# Patient Record
Sex: FEMALE | Race: BLACK OR AFRICAN AMERICAN | NOT HISPANIC OR LATINO | URBAN - METROPOLITAN AREA
[De-identification: names, ages, dates, MRNs, and addresses within clinical notes are randomized per-mention and may not be internally consistent; named-entity substitution may affect disease eponyms.]

---

## 2019-10-29 ENCOUNTER — EMERGENCY (EMERGENCY)
Facility: HOSPITAL | Age: 24
LOS: 1 days | Discharge: HOME | End: 2019-10-29
Attending: EMERGENCY MEDICINE | Admitting: EMERGENCY MEDICINE
Payer: COMMERCIAL

## 2019-10-29 VITALS
TEMPERATURE: 97 F | RESPIRATION RATE: 17 BRPM | DIASTOLIC BLOOD PRESSURE: 66 MMHG | OXYGEN SATURATION: 99 % | HEART RATE: 105 BPM | SYSTOLIC BLOOD PRESSURE: 93 MMHG

## 2019-10-29 PROCEDURE — 99284 EMERGENCY DEPT VISIT MOD MDM: CPT

## 2019-10-29 PROCEDURE — 82962 GLUCOSE BLOOD TEST: CPT

## 2019-10-29 PROCEDURE — 99285 EMERGENCY DEPT VISIT HI MDM: CPT

## 2019-10-29 NOTE — ED PROVIDER NOTE - CLINICAL SUMMARY MEDICAL DECISION MAKING FREE TEXT BOX
etoh intox, no signs of trauma, fs stable, pt very thin so hypotension is likely baseline, observed until clinically sober - pt tolerated po w/o dif, ambulatory w/steady gait, offers no c/o, stable for dc

## 2019-10-29 NOTE — ED PROVIDER NOTE - ATTENDING CONTRIBUTION TO CARE
24F per triage report "biba with c/o AMS, as per report, pt. was found in a building, bottle of whisky next to her, pt. vomited in ambulance." Pt not providing any info at this time. Mild tachycardia and hypotension (pt is very small), other vitals wnl. Exam as above.  ddx: Likely etoh intox. no indication of any trauma.   FSG wnl.   Will hold on labs/IVF for now, observe in ED, reassess.

## 2019-10-29 NOTE — ED PROVIDER NOTE - ENMT, MLM
Airway patent, Nasal mucosa clear. Mouth with normal mucosa. Throat has no vesicles, no oropharyngeal exudates and uvula is midline. no swelling, no contusions, no signs of trauma

## 2019-10-29 NOTE — ED PROVIDER NOTE - NSFOLLOWUPINSTRUCTIONS_ED_ALL_ED_FT
Alcohol intoxication occurs when the amount of alcohol that a person has consumed impairs his or her ability to mentally and physically function. Chronic alcohol consumption can also lead to a variety of health issues including neurological disease, stomach disease, heart disease, liver disease, etc. Do not drive after drinking alcohol. Drinking enough alcohol to end up in an Emergency Room suggests you may have an alcohol abuse problem. Seek help at a drug addiction center.    SEEK IMMEDIATE MEDICAL CARE IF YOU HAVE ANY OF THE FOLLOWING SYMPTOMS: seizures, vomiting blood, blood in your stool, lightheadedness/dizziness, or becoming shaky to tremulous when you stop drinking.

## 2019-10-29 NOTE — ED PROVIDER NOTE - PATIENT PORTAL LINK FT
You can access the FollowMyHealth Patient Portal offered by [facility] by registering at the following website: [email or web address]. By joining [employer]’s FollowMyHealth portal, you will also be able to view your health information using other applications (apps) compatible with our system.

## 2019-10-29 NOTE — ED ADULT TRIAGE NOTE - CHIEF COMPLAINT QUOTE
pt. biba with c/o AMS, as per report, pt. was found in a building, bottle of whisky next to her, pt. vomited in ambulance, pt. is unable to provide any hx at this time.

## 2019-10-29 NOTE — ED PROVIDER NOTE - PROGRESS NOTE DETAILS
[place]: sleeping comfortably, no resp distress. attempts to talk to tactile stimuli very slight improvement. Likely intox, will continue to observe/reassess. pt aaox3, ambulated w/steady gait, given water to drink and sandwich to eat, states that was out partying last night, lives in [place] - will wait until about 5 am to safely dc

## 2019-10-29 NOTE — ED ADULT NURSE NOTE - OBJECTIVE STATEMENT
Received a 24 year old female with a chief complaint of AMS secondary to reported ETOH consumption this evening. Airway patent. Patient noted to have vomited prior to arrival. No signs of acute distress. Patient belongings removed and secured. No obvious injury noted.

## 2019-10-29 NOTE — ED PROVIDER NOTE - OBJECTIVE STATEMENT
The pt is a 25 y/o F, BIBA, for intox. Pt found sleeping. Currently refusing to answer all questions.

## 2019-10-30 VITALS
HEART RATE: 97 BPM | SYSTOLIC BLOOD PRESSURE: 100 MMHG | DIASTOLIC BLOOD PRESSURE: 62 MMHG | RESPIRATION RATE: 16 BRPM | OXYGEN SATURATION: 100 %

## 2019-10-30 NOTE — ED ADULT NURSE REASSESSMENT NOTE - NS ED NURSE REASSESS COMMENT FT1
Patient woke up. Patient awake, verbally responsive and coherent. Patient re-oriented to time and place. Patient allowed to rest until the morning. Patient belongings returned.

## 2019-11-03 DIAGNOSIS — F10.129 ALCOHOL ABUSE WITH INTOXICATION, UNSPECIFIED: ICD-10-CM

## 2019-11-03 DIAGNOSIS — R41.82 ALTERED MENTAL STATUS, UNSPECIFIED: ICD-10-CM
